# Patient Record
Sex: MALE | Race: OTHER
[De-identification: names, ages, dates, MRNs, and addresses within clinical notes are randomized per-mention and may not be internally consistent; named-entity substitution may affect disease eponyms.]

---

## 2018-11-10 ENCOUNTER — HOSPITAL ENCOUNTER (EMERGENCY)
Dept: HOSPITAL 72 - EMR | Age: 33
LOS: 1 days | Discharge: HOME | End: 2018-11-11
Payer: SELF-PAY

## 2018-11-10 VITALS — DIASTOLIC BLOOD PRESSURE: 70 MMHG | SYSTOLIC BLOOD PRESSURE: 125 MMHG

## 2018-11-10 VITALS — SYSTOLIC BLOOD PRESSURE: 149 MMHG | DIASTOLIC BLOOD PRESSURE: 79 MMHG

## 2018-11-10 VITALS — BODY MASS INDEX: 25.2 KG/M2 | WEIGHT: 180 LBS | HEIGHT: 71 IN

## 2018-11-10 VITALS — SYSTOLIC BLOOD PRESSURE: 136 MMHG | DIASTOLIC BLOOD PRESSURE: 83 MMHG

## 2018-11-10 VITALS — DIASTOLIC BLOOD PRESSURE: 84 MMHG | SYSTOLIC BLOOD PRESSURE: 134 MMHG

## 2018-11-10 VITALS — SYSTOLIC BLOOD PRESSURE: 129 MMHG | DIASTOLIC BLOOD PRESSURE: 73 MMHG

## 2018-11-10 VITALS — SYSTOLIC BLOOD PRESSURE: 138 MMHG | DIASTOLIC BLOOD PRESSURE: 89 MMHG

## 2018-11-10 VITALS — SYSTOLIC BLOOD PRESSURE: 132 MMHG | DIASTOLIC BLOOD PRESSURE: 88 MMHG

## 2018-11-10 DIAGNOSIS — F23: Primary | ICD-10-CM

## 2018-11-10 LAB
ADD MANUAL DIFF: NO
ALBUMIN SERPL-MCNC: 4.3 G/DL (ref 3.4–5)
ALBUMIN/GLOB SERPL: 0.9 {RATIO} (ref 1–2.7)
ALP SERPL-CCNC: 127 U/L (ref 46–116)
ALT SERPL-CCNC: 63 U/L (ref 12–78)
ANION GAP SERPL CALC-SCNC: 11 MMOL/L (ref 5–15)
AST SERPL-CCNC: 45 U/L (ref 15–37)
BASOPHILS NFR BLD AUTO: 0.9 % (ref 0–2)
BILIRUB DIRECT SERPL-MCNC: 0.3 MG/DL (ref 0–0.3)
BILIRUB SERPL-MCNC: 1.2 MG/DL (ref 0.2–1)
BUN SERPL-MCNC: 13 MG/DL (ref 7–18)
CALCIUM SERPL-MCNC: 9.7 MG/DL (ref 8.5–10.1)
CHLORIDE SERPL-SCNC: 104 MMOL/L (ref 98–107)
CO2 SERPL-SCNC: 26 MMOL/L (ref 21–32)
CREAT SERPL-MCNC: 1.2 MG/DL (ref 0.55–1.3)
EOSINOPHIL NFR BLD AUTO: 0.8 % (ref 0–3)
ERYTHROCYTE [DISTWIDTH] IN BLOOD BY AUTOMATED COUNT: 10.6 % (ref 11.6–14.8)
GLOBULIN SER-MCNC: 4.6 G/DL
HCT VFR BLD CALC: 48.4 % (ref 42–52)
HGB BLD-MCNC: 16.7 G/DL (ref 14.2–18)
LYMPHOCYTES NFR BLD AUTO: 12 % (ref 20–45)
MCV RBC AUTO: 85 FL (ref 80–99)
MONOCYTES NFR BLD AUTO: 7.3 % (ref 1–10)
NEUTROPHILS NFR BLD AUTO: 79 % (ref 45–75)
PLATELET # BLD: 308 K/UL (ref 150–450)
POTASSIUM SERPL-SCNC: 3.3 MMOL/L (ref 3.5–5.1)
RBC # BLD AUTO: 5.66 M/UL (ref 4.7–6.1)
SODIUM SERPL-SCNC: 141 MMOL/L (ref 136–145)
WBC # BLD AUTO: 9.6 K/UL (ref 4.8–10.8)

## 2018-11-10 PROCEDURE — 80307 DRUG TEST PRSMV CHEM ANLYZR: CPT

## 2018-11-10 PROCEDURE — 82248 BILIRUBIN DIRECT: CPT

## 2018-11-10 PROCEDURE — 99285 EMERGENCY DEPT VISIT HI MDM: CPT

## 2018-11-10 PROCEDURE — 80329 ANALGESICS NON-OPIOID 1 OR 2: CPT

## 2018-11-10 PROCEDURE — 85025 COMPLETE CBC W/AUTO DIFF WBC: CPT

## 2018-11-10 PROCEDURE — 36415 COLL VENOUS BLD VENIPUNCTURE: CPT

## 2018-11-10 PROCEDURE — 80053 COMPREHEN METABOLIC PANEL: CPT

## 2018-11-10 NOTE — EMERGENCY ROOM REPORT
History of Present Illness


General


Chief Complaint:  Behavioral Complaint


Source:  Patient


 (Mark Garcia DO)





Present Illness


HPI


Patient possessed by paramedics and police department


Placed on a 5150 by police


The patient was found acting inappropriate outside of his apartment


The people in the other apartment apparently reported the patient appeared to 

be acting erratic


They report the patient stating that he wanted to hurt other people





Upon arrival the patient denies any suicidal or homicidal thoughts


He reports that he has been up for several days and thinks that he needed to 

essentially calm down





Denies any previous medical history


 (BobbyjosephMark )


Allergies:  


Coded Allergies:  


     No Known Allergies (Unverified , 11/10/18)





Patient History


Past Medical History:  see triage record


Pertinent Family History:  none


Reviewed Nursing Documentation:  PMH: Agreed; PSxH: Agreed (Mark Garcia DO)





Nursing Documentation-PMH


Past Medical History:  No History, Except For


 (RadhaMark LA)





Review of Systems


All Other Systems:  negative except mentioned in HPI


 (BobbyjosephMark LA)





Physical Exam





Vital Signs








  Date Time  Temp Pulse Resp B/P (MAP) Pulse Ox O2 Delivery O2 Flow Rate FiO2


 


11/10/18 10:15 98.4 100 18 117/75 99 Room Air  








Sp02 EP Interpretation:  reviewed, normal


General Appearance:  well appearing, no apparent distress


Head:  normocephalic, atraumatic


Eyes:  bilateral eye PERRL, bilateral eye EOMI


ENT:  hearing grossly normal, normal pharynx


Neck:  full range of motion, supple


Respiratory:  chest non-tender, lungs clear


Cardiovascular #1:  regular rate, rhythm, no edema


Gastrointestinal:  non tender, soft


Genitourinary:  no CVA tenderness


Musculoskeletal:  normal inspection


Neurologic:  alert, oriented x3, responsive


Psychiatric:  mood/affect normal


Skin:  normal color, no rash


Lymphatic:  no adenopathy


 (Mark Garcia DO)





Medical Decision Making


Diagnostic Impression:  


 Primary Impression:  


 Behavioral change


 Additional Impression:  


 Acute psychosis


ER Course


Currently the patient denies any homicidal or suicidal thoughts


He was however placed on a 5150 by police department initially given the 

erratic behavior





Patient is found to have amphetamine positive


Continues to rest comfortably in the emergency room


Psychiatric consultation is being made for further evaluation





Patient is accepted in transfer to psychiatric facility for further care





Labs








Test


  11/10/18


10:40


 


White Blood Count


  9.6 K/UL


(4.8-10.8)


 


Red Blood Count


  5.66 M/UL


(4.70-6.10)


 


Hemoglobin


  16.7 G/DL


(14.2-18.0)


 


Hematocrit


  48.4 %


(42.0-52.0)


 


Mean Corpuscular Volume 85 FL (80-99) 


 


Mean Corpuscular Hemoglobin


  29.5 PG


(27.0-31.0)


 


Mean Corpuscular Hemoglobin


Concent 34.6 G/DL


(32.0-36.0)


 


Red Cell Distribution Width


  10.6 %


(11.6-14.8)


 


Platelet Count


  308 K/UL


(150-450)


 


Mean Platelet Volume


  6.3 FL


(6.5-10.1)


 


Neutrophils (%) (Auto)


  79.0 %


(45.0-75.0)


 


Lymphocytes (%) (Auto)


  12.0 %


(20.0-45.0)


 


Monocytes (%) (Auto)


  7.3 %


(1.0-10.0)


 


Eosinophils (%) (Auto)


  0.8 %


(0.0-3.0)


 


Basophils (%) (Auto)


  0.9 %


(0.0-2.0)


 


Sodium Level


  141 MMOL/L


(136-145)


 


Potassium Level


  3.3 MMOL/L


(3.5-5.1)


 


Chloride Level


  104 MMOL/L


()


 


Carbon Dioxide Level


  26 MMOL/L


(21-32)


 


Anion Gap


  11 mmol/L


(5-15)


 


Blood Urea Nitrogen


  13 mg/dL


(7-18)


 


Creatinine


  1.2 MG/DL


(0.55-1.30)


 


Estimat Glomerular Filtration


Rate > 60 mL/min


(>60)


 


Glucose Level


  89 MG/DL


()


 


Calcium Level


  9.7 MG/DL


(8.5-10.1)


 


Total Bilirubin


  1.2 MG/DL


(0.2-1.0)


 


Direct Bilirubin


  0.3 MG/DL


(0.0-0.3)


 


Aspartate Amino Transf


(AST/SGOT) 45 U/L (15-37) 


 


 


Alanine Aminotransferase


(ALT/SGPT) 63 U/L (12-78) 


 


 


Alkaline Phosphatase


  127 U/L


()


 


Total Protein


  8.9 G/DL


(6.4-8.2)


 


Albumin


  4.3 G/DL


(3.4-5.0)


 


Globulin 4.6 g/dL 


 


Albumin/Globulin Ratio 0.9 (1.0-2.7) 


 


Salicylates Level


  < 0.2 ug/mL


(2.8-20)


 


Urine Opiates Screen


  Negative


(NEGATIVE)


 


Acetaminophen Level


  < 2 MCG/ML


(10-30)


 


Urine Barbiturates Screen


  Negative


(NEGATIVE)


 


Phencyclidine (PCP) Screen


  Negative


(NEGATIVE)


 


Urine Amphetamines Screen


  Positive


(NEGATIVE)


 


Urine Benzodiazepines Screen


  Negative


(NEGATIVE)


 


Urine Cocaine Screen


  Negative


(NEGATIVE)


 


Urine Marijuana (THC) Screen


  Negative


(NEGATIVE)


 


Serum Alcohol < 3 mg/dL 








 (Mark Garcia DO)


ER Course


The patient was discussed with [facility] by staff and will be transferred for 

psychiatric treatment. 


 (Anshul Thomas MD)





Last Vital Signs








  Date Time  Temp Pulse Resp B/P (MAP) Pulse Ox O2 Delivery O2 Flow Rate FiO2


 


11/10/18 10:25  97 21   Room Air  


 


11/10/18 10:25 98.4   134/84 99   








Status:  improved


 (Mark Garcia DO)


Disposition:  XFER TO PSYCH HOSP/UNIT


Condition:  Improved


Referrals:  


NOT CHOSEN IPA/MD,REFERRING (PCP)











Mark Garcia DO Nov 10, 2018 13:20


Anshul Thomas MD Nov 10, 2018 18:36

## 2018-11-11 VITALS — DIASTOLIC BLOOD PRESSURE: 63 MMHG | SYSTOLIC BLOOD PRESSURE: 125 MMHG
